# Patient Record
Sex: FEMALE | Race: WHITE | Employment: PART TIME | URBAN - METROPOLITAN AREA
[De-identification: names, ages, dates, MRNs, and addresses within clinical notes are randomized per-mention and may not be internally consistent; named-entity substitution may affect disease eponyms.]

---

## 2017-11-17 ENCOUNTER — APPOINTMENT (OUTPATIENT)
Dept: CT IMAGING | Age: 47
End: 2017-11-17
Payer: MEDICARE

## 2017-11-17 ENCOUNTER — HOSPITAL ENCOUNTER (EMERGENCY)
Age: 47
Discharge: HOME | End: 2017-11-17
Attending: EMERGENCY MEDICINE
Payer: MEDICARE

## 2017-11-17 VITALS
HEIGHT: 66 IN | BODY MASS INDEX: 25.26 KG/M2 | HEART RATE: 80 BPM | RESPIRATION RATE: 16 BRPM | OXYGEN SATURATION: 100 % | SYSTOLIC BLOOD PRESSURE: 152 MMHG | WEIGHT: 157.19 LBS | DIASTOLIC BLOOD PRESSURE: 84 MMHG | TEMPERATURE: 98.1 F

## 2017-11-17 DIAGNOSIS — F43.9 SITUATIONAL STRESS: Primary | ICD-10-CM

## 2017-11-17 DIAGNOSIS — Z86.59 HISTORY OF ANXIETY DISORDER: ICD-10-CM

## 2017-11-17 LAB
ABSOLUTE EOS #: 0.3 K/UL (ref 0–0.4)
ABSOLUTE IMMATURE GRANULOCYTE: ABNORMAL K/UL (ref 0–0.3)
ABSOLUTE LYMPH #: 3 K/UL (ref 1–4.8)
ABSOLUTE MONO #: 0.5 K/UL (ref 0.2–0.8)
ANION GAP SERPL CALCULATED.3IONS-SCNC: 16 MMOL/L (ref 9–17)
BASOPHILS # BLD: 1 %
BASOPHILS ABSOLUTE: 0.1 K/UL (ref 0–0.2)
BUN BLDV-MCNC: 10 MG/DL (ref 6–20)
BUN/CREAT BLD: 16 (ref 9–20)
CALCIUM SERPL-MCNC: 9.5 MG/DL (ref 8.6–10.4)
CHLORIDE BLD-SCNC: 100 MMOL/L (ref 98–107)
CO2: 21 MMOL/L (ref 20–31)
CREAT SERPL-MCNC: 0.61 MG/DL (ref 0.5–0.9)
DIFFERENTIAL TYPE: ABNORMAL
EKG ATRIAL RATE: 70 BPM
EKG P AXIS: 59 DEGREES
EKG P-R INTERVAL: 110 MS
EKG Q-T INTERVAL: 400 MS
EKG QRS DURATION: 100 MS
EKG QTC CALCULATION (BAZETT): 432 MS
EKG R AXIS: 83 DEGREES
EKG T AXIS: 56 DEGREES
EKG VENTRICULAR RATE: 70 BPM
EOSINOPHILS RELATIVE PERCENT: 3 %
GFR AFRICAN AMERICAN: >60 ML/MIN
GFR NON-AFRICAN AMERICAN: >60 ML/MIN
GFR SERPL CREATININE-BSD FRML MDRD: NORMAL ML/MIN/{1.73_M2}
GFR SERPL CREATININE-BSD FRML MDRD: NORMAL ML/MIN/{1.73_M2}
GLUCOSE BLD-MCNC: 90 MG/DL (ref 70–99)
HCT VFR BLD CALC: 33.4 % (ref 36–46)
HEMOGLOBIN: 10.8 G/DL (ref 12–16)
IMMATURE GRANULOCYTES: ABNORMAL %
LYMPHOCYTES # BLD: 37 %
MCH RBC QN AUTO: 25.1 PG (ref 26–34)
MCHC RBC AUTO-ENTMCNC: 32.3 G/DL (ref 31–37)
MCV RBC AUTO: 77.8 FL (ref 80–100)
MONOCYTES # BLD: 6 %
MYOGLOBIN: 38 NG/ML (ref 25–58)
PDW BLD-RTO: 16.7 % (ref 11.5–14.5)
PLATELET # BLD: 575 K/UL (ref 130–400)
PLATELET ESTIMATE: ABNORMAL
PMV BLD AUTO: 6.8 FL (ref 6–12)
POTASSIUM SERPL-SCNC: 4.1 MMOL/L (ref 3.7–5.3)
RBC # BLD: 4.29 M/UL (ref 4–5.2)
RBC # BLD: ABNORMAL 10*6/UL
SEG NEUTROPHILS: 53 %
SEGMENTED NEUTROPHILS ABSOLUTE COUNT: 4.3 K/UL (ref 1.8–7.7)
SODIUM BLD-SCNC: 137 MMOL/L (ref 135–144)
TROPONIN INTERP: NORMAL
TROPONIN T: <0.03 NG/ML
WBC # BLD: 8.1 K/UL (ref 3.5–11)
WBC # BLD: ABNORMAL 10*3/UL

## 2017-11-17 PROCEDURE — 99284 EMERGENCY DEPT VISIT MOD MDM: CPT

## 2017-11-17 PROCEDURE — 80048 BASIC METABOLIC PNL TOTAL CA: CPT

## 2017-11-17 PROCEDURE — 85025 COMPLETE CBC W/AUTO DIFF WBC: CPT

## 2017-11-17 PROCEDURE — 93005 ELECTROCARDIOGRAM TRACING: CPT

## 2017-11-17 PROCEDURE — 6370000000 HC RX 637 (ALT 250 FOR IP): Performed by: NURSE PRACTITIONER

## 2017-11-17 PROCEDURE — 84484 ASSAY OF TROPONIN QUANT: CPT

## 2017-11-17 PROCEDURE — 83874 ASSAY OF MYOGLOBIN: CPT

## 2017-11-17 PROCEDURE — 36415 COLL VENOUS BLD VENIPUNCTURE: CPT

## 2017-11-17 PROCEDURE — 70450 CT HEAD/BRAIN W/O DYE: CPT

## 2017-11-17 RX ORDER — QUETIAPINE FUMARATE 25 MG/1
25 TABLET, FILM COATED ORAL 2 TIMES DAILY
Qty: 60 TABLET | Refills: 0 | Status: SHIPPED | OUTPATIENT
Start: 2017-11-17

## 2017-11-17 RX ORDER — ALPRAZOLAM 0.5 MG/1
0.5 TABLET ORAL 3 TIMES DAILY PRN
Qty: 15 TABLET | Refills: 0 | Status: SHIPPED | OUTPATIENT
Start: 2017-11-17 | End: 2017-12-17

## 2017-11-17 RX ORDER — LORAZEPAM 1 MG/1
1 TABLET ORAL ONCE
Status: COMPLETED | OUTPATIENT
Start: 2017-11-17 | End: 2017-11-17

## 2017-11-17 RX ADMIN — LORAZEPAM 1 MG: 1 TABLET ORAL at 00:48

## 2017-11-17 ASSESSMENT — ENCOUNTER SYMPTOMS: SHORTNESS OF BREATH: 0

## 2017-11-17 NOTE — ED PROVIDER NOTES
Latest Ref Range: 3.7 - 5.3 mmol/L 4.1   Chloride Latest Ref Range: 98 - 107 mmol/L 100   CO2 Latest Ref Range: 20 - 31 mmol/L 21   BUN Latest Ref Range: 6 - 20 mg/dL 10   Creatinine Latest Ref Range: 0.50 - 0.90 mg/dL 0.61   Bun/Cre Ratio Latest Ref Range: 9 - 20  16   Anion Gap Latest Ref Range: 9 - 17 mmol/L 16   GFR Non- Latest Ref Range: >60 mL/min >60   GFR African American Latest Ref Range: >60 mL/min >60   Glucose Latest Ref Range: 70 - 99 mg/dL 90   Calcium Latest Ref Range: 8.6 - 10.4 mg/dL 9.5   GFR Comment Unknown Pend   GFR Staging Unknown NOT REPORTED   Myoglobin Latest Ref Range: 25 - 58 ng/mL 38   Troponin T Latest Ref Range: <0.03 ng/mL <0.03   Troponin Interp Unknown Pend   WBC Latest Ref Range: 3.5 - 11.0 k/uL 8.1   RBC Latest Ref Range: 4.0 - 5.2 m/uL 4.29   Hemoglobin Quant Latest Ref Range: 12.0 - 16.0 g/dL 10.8 (L)   Hematocrit Latest Ref Range: 36 - 46 % 33.4 (L)   MCV Latest Ref Range: 80 - 100 fL 77.8 (L)   MCH Latest Ref Range: 26 - 34 pg 25.1 (L)   MCHC Latest Ref Range: 31 - 37 g/dL 32.3   MPV Latest Ref Range: 6.0 - 12.0 fL 6.8   RDW Latest Ref Range: 11.5 - 14.5 % 16.7 (H)   Platelet Count Latest Ref Range: 130 - 400 k/uL 575 (H)   Platelet Estimate Unknown NOT REPORTED   Absolute Mono # Latest Ref Range: 0.2 - 0.8 k/uL 0.50   Eosinophils % Latest Units: % 3   Basophils # Latest Ref Range: 0.0 - 0.2 k/uL 0.10   Differential Type Unknown NOT REPORTED   Seg Neutrophils Latest Units: % 53   Segs Absolute Latest Ref Range: 1.8 - 7.7 k/uL 4.30   Lymphocytes Latest Units: % 37   Absolute Lymph # Latest Ref Range: 1.0 - 4.8 k/uL 3.00   Monocytes Latest Units: % 6   Absolute Eos # Latest Ref Range: 0.0 - 0.4 k/uL 0.30   Basophils Latest Units: % 1   Immature Granulocytes Latest Ref Range: 0 % NOT REPORTED   WBC Morphology Unknown NOT REPORTED   RBC Morphology Unknown NOT REPORTED     All other labs were within normal range or not returned as of this dictation.     EMERGENCY dictations but occasionally words are mis-transcribed.)    Deborah Jennings MD  Attending Emergency Physician         Deborah Jennings MD  11/17/17 [id number]

## 2017-11-17 NOTE — ED NOTES
Pt presents to ED via private auto with c/o headache and eye problem. Pt states that \"earlier today she had a headache and lines that were blue, green and yellow in her visual field, could feel her heart racing in her chest and ears, and had some dizziness. \" Pt denies having dizziness, headache, palpitations, and visual disturbances at this time. Pt's neuro assessment is WNL. Pt's mucous membranes are pink and moist, pt's skin is warm and dry. Pt's respirations are even and non-labored, no distress noted at this time, will continue to monitor pt.       Oscar Borrego RN  11/17/17 [id number]

## 2024-11-19 ENCOUNTER — HOSPITAL ENCOUNTER (EMERGENCY)
Age: 54
Discharge: HOME | End: 2024-11-19
Attending: EMERGENCY MEDICINE
Payer: COMMERCIAL

## 2024-11-19 VITALS
RESPIRATION RATE: 16 BRPM | BODY MASS INDEX: 25.71 KG/M2 | SYSTOLIC BLOOD PRESSURE: 167 MMHG | HEIGHT: 66 IN | HEART RATE: 59 BPM | OXYGEN SATURATION: 98 % | WEIGHT: 160 LBS | DIASTOLIC BLOOD PRESSURE: 80 MMHG | TEMPERATURE: 98.1 F

## 2024-11-19 DIAGNOSIS — N89.8 VAGINAL IRRITATION: Primary | ICD-10-CM

## 2024-11-19 LAB
BILIRUB UR QL STRIP: NEGATIVE
CANDIDA SPECIES: NEGATIVE
CLARITY UR: ABNORMAL
COLOR UR: YELLOW
EPI CELLS #/AREA URNS HPF: NORMAL /HPF (ref 0–5)
GARDNERELLA VAGINALIS: NEGATIVE
GLUCOSE UR STRIP-MCNC: NEGATIVE MG/DL
HGB UR QL STRIP.AUTO: NEGATIVE
KETONES UR STRIP-MCNC: NEGATIVE MG/DL
LEUKOCYTE ESTERASE UR QL STRIP: ABNORMAL
NITRITE UR QL STRIP: NEGATIVE
PH UR STRIP: 5.5 [PH] (ref 5–8)
PROT UR STRIP-MCNC: NEGATIVE MG/DL
RBC #/AREA URNS HPF: NORMAL /HPF (ref 0–2)
SOURCE: NORMAL
SP GR UR STRIP: 1.02 (ref 1–1.03)
TRICHOMONAS: NEGATIVE
UROBILINOGEN UR STRIP-ACNC: NORMAL EU/DL (ref 0–1)
WBC #/AREA URNS HPF: NORMAL /HPF (ref 0–5)

## 2024-11-19 PROCEDURE — 99283 EMERGENCY DEPT VISIT LOW MDM: CPT

## 2024-11-19 PROCEDURE — 87660 TRICHOMONAS VAGIN DIR PROBE: CPT

## 2024-11-19 PROCEDURE — 87510 GARDNER VAG DNA DIR PROBE: CPT

## 2024-11-19 PROCEDURE — 87086 URINE CULTURE/COLONY COUNT: CPT

## 2024-11-19 PROCEDURE — 81001 URINALYSIS AUTO W/SCOPE: CPT

## 2024-11-19 PROCEDURE — 87480 CANDIDA DNA DIR PROBE: CPT

## 2024-11-19 ASSESSMENT — PAIN - FUNCTIONAL ASSESSMENT: PAIN_FUNCTIONAL_ASSESSMENT: NONE - DENIES PAIN

## 2024-11-19 NOTE — ED NOTES
Pt reports recent vaginal itching, during which she will become sweaty and feels her heart rate increasing. Reports frequent  urination and states she recently had a period, despite being in menopause. Pt also reports brain fog over the past couple of months. States she previously had white vaginal discharge which became clear after using monistat cream. Pt also reports she has been taking AZO and cranberry tablets. States her insurance recently changed so she is no longer able to see the doctors she has seen for 20 years and she is in the process of trying to find new ones.

## 2024-11-19 NOTE — DISCHARGE INSTRUCTIONS
Please follow up with GYN to discuss medications to help with vaginal irritation which I believe is related to menopause.

## 2024-11-19 NOTE — ED PROVIDER NOTES
to the emergency department with complaints of vaginal discharge and itching sensation to the vagina.  She treated herself with Monistat about 2 weeks ago for for what she thought was a yeast infection.  At that time she had white vaginal discharge with itching discomfort.  She says the white discharge has improved but the itchiness has remained.  She now has a clear discharge.  She has been scratching herself and so now when she urinates she has pain with urination.  She denies urinary frequency.  No hematuria.  No abdominal pain.  No flank pain.  She is not sexually active.  Has not had sexual intercourse in over 4 years.    On exam patient is awake, alert and in no acute distress.  She is not febrile or toxic.  Abdomen is soft without rebound or guarding.  No tenderness on exam.  Urine and vaginitis panels ordered.    Urinalysis shows small amount of leukocytes with 2-5 white blood cells and 5-10 epithelial cells on micro.  This is likely contamination.  This will be sent for culture.  Vaginitis panel was negative for yeast, BV and trichomonas.  I think her symptoms are more consistent with dry irritated vagina secondary to menopause.  I recommend that she contact her GYN provider to follow-up and discuss medications to help manage this.  She verbalized understanding of all and agrees with discharge plan.    CONSULTS:  None    PROCEDURES:  Procedures    FINAL IMPRESSION      1. Vaginal irritation          DISPOSITION/PLAN   Home    PATIENT REFERRED TO:  PCP and GYN    Call   to schedule appointment to establish care      DISCHARGE MEDICATIONS:  New Prescriptions    No medications on file       (Please note that portions of this note were completed with a voice recognition program.  Efforts were made to edit the dictations but occasionally words are mis-transcribed.)    LE STODDARD - She Zavala APRN - CNP  11/19/24 [id number]

## 2024-11-20 LAB
MICROORGANISM SPEC CULT: NORMAL
SERVICE CMNT-IMP: NORMAL
SPECIMEN DESCRIPTION: NORMAL

## 2024-12-20 ENCOUNTER — HOSPITAL ENCOUNTER (EMERGENCY)
Age: 54
Discharge: LEFT BEFORE DISCHARGE | End: 2024-12-20
Attending: EMERGENCY MEDICINE
Payer: COMMERCIAL

## 2024-12-20 ENCOUNTER — APPOINTMENT (OUTPATIENT)
Dept: GENERAL RADIOLOGY | Age: 54
End: 2024-12-20
Payer: COMMERCIAL

## 2024-12-20 VITALS
TEMPERATURE: 98.7 F | OXYGEN SATURATION: 97 % | RESPIRATION RATE: 11 BRPM | DIASTOLIC BLOOD PRESSURE: 89 MMHG | SYSTOLIC BLOOD PRESSURE: 176 MMHG | WEIGHT: 180 LBS | BODY MASS INDEX: 28.25 KG/M2 | HEIGHT: 67 IN | HEART RATE: 50 BPM

## 2024-12-20 DIAGNOSIS — R07.9 CHEST PAIN, UNSPECIFIED TYPE: ICD-10-CM

## 2024-12-20 DIAGNOSIS — R52 BODY ACHES: Primary | ICD-10-CM

## 2024-12-20 DIAGNOSIS — J06.9 VIRAL URI: ICD-10-CM

## 2024-12-20 LAB
ANION GAP SERPL CALCULATED.3IONS-SCNC: 11 MMOL/L (ref 9–16)
BASOPHILS # BLD: 0.06 K/UL (ref 0–0.2)
BASOPHILS NFR BLD: 1 % (ref 0–2)
BUN SERPL-MCNC: 9 MG/DL (ref 6–20)
CALCIUM SERPL-MCNC: 10 MG/DL (ref 8.6–10.4)
CHLORIDE SERPL-SCNC: 102 MMOL/L (ref 98–107)
CO2 SERPL-SCNC: 23 MMOL/L (ref 20–31)
CREAT SERPL-MCNC: 0.7 MG/DL (ref 0.5–0.9)
D DIMER PPP FEU-MCNC: <0.27 UG/ML FEU (ref 0–0.59)
EOSINOPHIL # BLD: 0.38 K/UL (ref 0–0.44)
EOSINOPHILS RELATIVE PERCENT: 5 % (ref 1–4)
ERYTHROCYTE [DISTWIDTH] IN BLOOD BY AUTOMATED COUNT: 12.3 % (ref 11.8–14.4)
FLUAV RNA RESP QL NAA+PROBE: NOT DETECTED
FLUBV RNA RESP QL NAA+PROBE: NOT DETECTED
GFR, ESTIMATED: >90 ML/MIN/1.73M2
GLUCOSE SERPL-MCNC: 87 MG/DL (ref 74–99)
HCT VFR BLD AUTO: 39.4 % (ref 36.3–47.1)
HGB BLD-MCNC: 13.2 G/DL (ref 11.9–15.1)
IMM GRANULOCYTES # BLD AUTO: 0.02 K/UL (ref 0–0.3)
IMM GRANULOCYTES NFR BLD: 0 %
LYMPHOCYTES NFR BLD: 2.33 K/UL (ref 1.1–3.7)
LYMPHOCYTES RELATIVE PERCENT: 31 % (ref 24–43)
MAGNESIUM SERPL-MCNC: 2.1 MG/DL (ref 1.6–2.6)
MCH RBC QN AUTO: 32.2 PG (ref 25.2–33.5)
MCHC RBC AUTO-ENTMCNC: 33.5 G/DL (ref 28.4–34.8)
MCV RBC AUTO: 96.1 FL (ref 82.6–102.9)
MONOCYTES NFR BLD: 0.52 K/UL (ref 0.1–1.2)
MONOCYTES NFR BLD: 7 % (ref 3–12)
NEUTROPHILS NFR BLD: 56 % (ref 36–65)
NEUTS SEG NFR BLD: 4.15 K/UL (ref 1.5–8.1)
NRBC BLD-RTO: 0 PER 100 WBC
PLATELET # BLD AUTO: 338 K/UL (ref 138–453)
PMV BLD AUTO: 9.1 FL (ref 8.1–13.5)
POTASSIUM SERPL-SCNC: 4.7 MMOL/L (ref 3.7–5.3)
RBC # BLD AUTO: 4.1 M/UL (ref 3.95–5.11)
SARS-COV-2 RNA RESP QL NAA+PROBE: NOT DETECTED
SODIUM SERPL-SCNC: 137 MMOL/L (ref 136–145)
SOURCE: NORMAL
SPECIMEN DESCRIPTION: NORMAL
TROPONIN I SERPL HS-MCNC: 8 NG/L (ref 0–14)
WBC OTHER # BLD: 7.5 K/UL (ref 3.5–11.3)

## 2024-12-20 PROCEDURE — 99285 EMERGENCY DEPT VISIT HI MDM: CPT

## 2024-12-20 PROCEDURE — 93005 ELECTROCARDIOGRAM TRACING: CPT | Performed by: EMERGENCY MEDICINE

## 2024-12-20 PROCEDURE — 84484 ASSAY OF TROPONIN QUANT: CPT

## 2024-12-20 PROCEDURE — 96374 THER/PROPH/DIAG INJ IV PUSH: CPT

## 2024-12-20 PROCEDURE — 87636 SARSCOV2 & INF A&B AMP PRB: CPT

## 2024-12-20 PROCEDURE — 80048 BASIC METABOLIC PNL TOTAL CA: CPT

## 2024-12-20 PROCEDURE — 6360000002 HC RX W HCPCS: Performed by: EMERGENCY MEDICINE

## 2024-12-20 PROCEDURE — 71045 X-RAY EXAM CHEST 1 VIEW: CPT

## 2024-12-20 PROCEDURE — 85025 COMPLETE CBC W/AUTO DIFF WBC: CPT

## 2024-12-20 PROCEDURE — 85379 FIBRIN DEGRADATION QUANT: CPT

## 2024-12-20 PROCEDURE — 83735 ASSAY OF MAGNESIUM: CPT

## 2024-12-20 RX ORDER — ONDANSETRON 2 MG/ML
4 INJECTION INTRAMUSCULAR; INTRAVENOUS ONCE
Status: COMPLETED | OUTPATIENT
Start: 2024-12-20 | End: 2024-12-20

## 2024-12-20 RX ADMIN — ONDANSETRON 4 MG: 2 INJECTION, SOLUTION INTRAMUSCULAR; INTRAVENOUS at 18:45

## 2024-12-20 ASSESSMENT — PAIN SCALES - GENERAL: PAINLEVEL_OUTOF10: 3

## 2024-12-20 ASSESSMENT — ENCOUNTER SYMPTOMS
EYE PAIN: 0
ABDOMINAL DISTENTION: 0
CHEST TIGHTNESS: 0
BACK PAIN: 0
ABDOMINAL PAIN: 0
EYE DISCHARGE: 0
FACIAL SWELLING: 0
SHORTNESS OF BREATH: 0

## 2024-12-20 ASSESSMENT — LIFESTYLE VARIABLES
HOW MANY STANDARD DRINKS CONTAINING ALCOHOL DO YOU HAVE ON A TYPICAL DAY: PATIENT DOES NOT DRINK
HOW OFTEN DO YOU HAVE A DRINK CONTAINING ALCOHOL: NEVER

## 2024-12-20 ASSESSMENT — HEART SCORE: ECG: NON-SPECIFC REPOLARIZATION DISTURBANCE/LBTB/PM

## 2024-12-20 ASSESSMENT — PAIN - FUNCTIONAL ASSESSMENT: PAIN_FUNCTIONAL_ASSESSMENT: 0-10

## 2024-12-20 ASSESSMENT — PAIN DESCRIPTION - DESCRIPTORS: DESCRIPTORS: ACHING

## 2024-12-20 NOTE — ED PROVIDER NOTES
EMERGENCY DEPARTMENT ENCOUNTER    Pt Name: Aziza Rizzo  MRN: [id number]  Birthdate 1970  Date of evaluation: 12/20/24  CHIEF COMPLAINT       Chief Complaint   Patient presents with    Fatigue    Generalized Body Aches     HISTORY OF PRESENT ILLNESS   HPI   The patient is a 54-year-old female who presented to the emergency department secondary to generalized bodyaches and fatigue.  Patient complains of 2 to 3-day history of generalized bodyaches cough fatigue.  Patient complains of chest pain intermittent in nature localized to the midsternal region nonradiating some nausea no vomiting.  Patient complains of shortness of breath denied previous history of COPD or asthma.  Patient denies nausea or vomiting.  No sick contacts, patient works in a public facing job.  Patient denied headache or neck pain    Of note, patient has a previous history of MI no stents she presents to the hospital so years ago.  She does not have a cardiologist  REVIEW OF SYSTEMS     Review of Systems   Constitutional:  Positive for fatigue. Negative for chills, diaphoresis and fever.   HENT:  Negative for congestion, ear pain and facial swelling.    Eyes:  Negative for pain, discharge and visual disturbance.   Respiratory:  Negative for chest tightness and shortness of breath.    Cardiovascular:  Negative for chest pain and palpitations.   Gastrointestinal:  Negative for abdominal distention and abdominal pain.   Genitourinary:  Negative for difficulty urinating and flank pain.   Musculoskeletal:  Negative for back pain.   Skin:  Negative for wound.   Neurological:  Negative for dizziness, light-headedness and headaches.     PASTMEDICAL HISTORY     Past Medical History:   Diagnosis Date    History of heart attack 2010    Thyroid disease 2005     Past Problem List  There is no problem list on file for this patient.    SURGICAL HISTORY       Past Surgical History:   Procedure Laterality Date    TONSILLECTOMY AND ADENOIDECTOMY  1976

## 2024-12-21 LAB
EKG ATRIAL RATE: 48 BPM
EKG P AXIS: 60 DEGREES
EKG P-R INTERVAL: 150 MS
EKG Q-T INTERVAL: 454 MS
EKG QRS DURATION: 90 MS
EKG QTC CALCULATION (BAZETT): 405 MS
EKG R AXIS: 74 DEGREES
EKG T AXIS: 48 DEGREES
EKG VENTRICULAR RATE: 48 BPM

## 2024-12-21 PROCEDURE — 93010 ELECTROCARDIOGRAM REPORT: CPT | Performed by: INTERNAL MEDICINE

## 2024-12-21 NOTE — ED NOTES
Patient arrived to ED with c/o fatigue accompanied with generalized body aches. Patient reports her symptoms have been ongoing for three days with no relief. Patient reported she had two episodes of diarrhea today accompanied with one episode of vomiting today. Patient reports having chest pain in her left sternum that does not radiate towards anywhere. Patient has history of a heart attack that occurred in 2010. Patient reports having history of HTN of which she take metoprolol at 2300. Patient expressed having SOB with exertion. Patient's respirations are equal and non-labored with no use of accessory muscles. Patient is A&Ox4, her speech is clear and she is able to express her needs. Patient denies additional needs at this time.